# Patient Record
Sex: MALE | Race: WHITE | NOT HISPANIC OR LATINO | Employment: FULL TIME | URBAN - METROPOLITAN AREA
[De-identification: names, ages, dates, MRNs, and addresses within clinical notes are randomized per-mention and may not be internally consistent; named-entity substitution may affect disease eponyms.]

---

## 2019-08-26 ENCOUNTER — PRE VISIT (OUTPATIENT)
Dept: ORTHOPEDICS | Facility: CLINIC | Age: 34
End: 2019-08-26

## 2019-08-26 NOTE — TELEPHONE ENCOUNTER
RECORDS RECEIVED FROM: Low back pain. - No history of surgery. No recent imaging. Med Recs at [facility]. Appt per wife, Faith    DATE RECEIVED: 8/26   NOTES STATUS DETAILS   OFFICE NOTE from referring provider n/a    OFFICE NOTE from other specialist n/a    DISCHARGE SUMMARY from hospital n/a    DISCHARGE REPORT from the ER n/a    OPERATIVE REPORT n/a    MEDICATION LIST n/a    MRI n/a    CT SCAN n/a    XRAYS (IMAGES & REPORTS) n/a    TUMOR     PATHOLOGY  Slides & report n/a    Sent request to NM 8/26  No records for back pain at [facility] 8/26  Tried calling patient to see where she was seen -left VM

## 2019-08-27 ENCOUNTER — OFFICE VISIT (OUTPATIENT)
Dept: ORTHOPEDICS | Facility: CLINIC | Age: 34
End: 2019-08-27
Payer: COMMERCIAL

## 2019-08-27 ENCOUNTER — ANCILLARY PROCEDURE (OUTPATIENT)
Dept: GENERAL RADIOLOGY | Facility: CLINIC | Age: 34
End: 2019-08-27
Attending: FAMILY MEDICINE
Payer: COMMERCIAL

## 2019-08-27 VITALS — HEIGHT: 73 IN | WEIGHT: 179 LBS | BODY MASS INDEX: 23.72 KG/M2

## 2019-08-27 DIAGNOSIS — G89.29 CHRONIC LOW BACK PAIN: ICD-10-CM

## 2019-08-27 DIAGNOSIS — M54.50 CHRONIC MIDLINE LOW BACK PAIN WITHOUT SCIATICA: Primary | ICD-10-CM

## 2019-08-27 DIAGNOSIS — G89.29 CHRONIC MIDLINE LOW BACK PAIN WITHOUT SCIATICA: Primary | ICD-10-CM

## 2019-08-27 DIAGNOSIS — M54.50 CHRONIC LOW BACK PAIN: ICD-10-CM

## 2019-08-27 ASSESSMENT — MIFFLIN-ST. JEOR: SCORE: 1800.82

## 2019-08-27 NOTE — PROGRESS NOTES
"Sports Medicine Clinic Visit    PCP: Yunior Gomez    Elijah Ge is a 33 year old male who is seen  as self referral presenting with lower back pain that has been bothering him over the last 5-6 years. He states that the pain has continued to worsen in the last few years.     Injury: None    Location of Pain: lower back  Duration of Pain: 6 year(s)  Pain is better with: Rest  Pain is worse with: Physical activity, heavy lifting  Additional Features:   Treatment so far consists of: Nothing  Prior History of related problems: noone    Ht 1.854 m (6' 1\")   Wt 81.2 kg (179 lb)   BMI 23.62 kg/m           PMH:  Active problem list:  Patient Active Problem List   Diagnosis     CARDIOVASCULAR SCREENING; LDL GOAL LESS THAN 160     Nasal obstruction     Deviated nasal septum     Preseptal cellulitis of left eye     Hx of LASIK       FH:  Family History   Problem Relation Age of Onset     Cardiovascular Son         VSD     Cancer No family hx of         no skin cancer       SH:  Social History     Socioeconomic History     Marital status:      Spouse name: Not on file     Number of children: Not on file     Years of education: Not on file     Highest education level: Not on file   Occupational History     Not on file   Social Needs     Financial resource strain: Not on file     Food insecurity:     Worry: Not on file     Inability: Not on file     Transportation needs:     Medical: Not on file     Non-medical: Not on file   Tobacco Use     Smoking status: Never Smoker     Smokeless tobacco: Never Used   Substance and Sexual Activity     Alcohol use: Yes     Comment: less than once a week     Drug use: No     Sexual activity: Yes     Partners: Female   Lifestyle     Physical activity:     Days per week: Not on file     Minutes per session: Not on file     Stress: Not on file   Relationships     Social connections:     Talks on phone: Not on file     Gets together: Not on file     Attends Rastafarian service: Not " on file     Active member of club or organization: Not on file     Attends meetings of clubs or organizations: Not on file     Relationship status: Not on file     Intimate partner violence:     Fear of current or ex partner: Not on file     Emotionally abused: Not on file     Physically abused: Not on file     Forced sexual activity: Not on file   Other Topics Concern     Parent/sibling w/ CABG, MI or angioplasty before 65F 55M? No   Social History Narrative     Not on file       MEDS:  See EMR, reviewed  ALL:  See EMR, reviewed    REVIEW OF SYSTEMS:  CONSTITUTIONAL:NEGATIVE for fever, chills, change in weight  INTEGUMENTARY/SKIN: NEGATIVE for worrisome rashes, moles or lesions  EYES: NEGATIVE for vision changes or irritation  ENT/MOUTH: NEGATIVE for ear, mouth and throat problems  RESP:NEGATIVE for significant cough or SOB  BREAST: NEGATIVE for masses, tenderness or discharge  CV: NEGATIVE for chest pain, palpitations or peripheral edema  GI: NEGATIVE for nausea, abdominal pain, heartburn, or change in bowel habits  :NEGATIVE for frequency, dysuria, or hematuria  :NEGATIVE for frequency, dysuria, or hematuria  NEURO: NEGATIVE for weakness, dizziness or paresthesias  ENDOCRINE: NEGATIVE for temperature intolerance, skin/hair changes  HEME/ALLERGY/IMMUNE: NEGATIVE for bleeding problems  PSYCHIATRIC: NEGATIVE for changes in mood or affect         Subjective: This 33-year-old male notes centralized low back discomfort after workout activities.  He will notice it after using the rowing machine or after weighted squats.  These are  recent exercises that he started with the help of an .  However the  has been closely watching his form and says that his form is correct and he can't understand why the exercises are uncomfortable for him.  He also notes discomfort with lifting his children.  He points to the centralized low back as the area of pain.  Is not associated with radiating pain  into the extremities.  No numbness or tingling.  No history of back injury.  Is been a recurrent problem for 5 years.  It does not wake him from sleep.  Is not present in the morning as a pattern of morning stiffness.  He has a seated job at a computer.    Forward flexion of lumbar spine to touch shins.  Extension full, without limitation.  Side-to-side bends without limitation.  Stands on tip-toes and rocks back on heels without limitation.  PSIS joints excurse symmetrically.    Straight leg raise in seated position with chin-to-chest negative bilaterally.    Lower extremity strength is 5/5 symmetrically bilaterally to flexion and extension at hips, knees, ankles, including toe strength and foot evertor strength.    FLORIDA test negative.  Normal ROM at hips bilaterally.  Nontender over bilateral SI joints.  Sacral compression without discomfort.  Spring testing of lumbar spine without discomfort at any level.    Inguinal pulses and posterior tibial pulses strong and symmetrical bilaterally.  No abdominal masses palpated.      Sensation to light touch intact bilateral lower extremities.    Skin overlying low back wnl.  Appropriate in conversation and affect      An x-ray of the lumbar spine shows minimal joint space narrowing at L5-S1 but otherwise good maintenance of the joint space.    Assessment recurrent low back discomfort with mild degenerative disc disease.  Next    Plan: He will start out with the proper core strength training program.  Physical therapy referral placed.  He has a sit/stand station at work that he will take advantage of.  He also does yoga.  He understands that he is not improving with physical therapy he will follow-up and an MRI of the lumbar spine can be considered.

## 2019-08-27 NOTE — LETTER
"  8/27/2019      RE: Elijah Ge  [address] [facility] [place] [address]       Sports Medicine Clinic Visit    PCP: Yunior Gomez    Elijah Ge is a 33 year old male who is seen  as self referral presenting with lower back pain that has been bothering him over the last 5-6 years. He states that the pain has continued to worsen in the last few years.     Injury: None    Location of Pain: lower back  Duration of Pain: 6 year(s)  Pain is better with: Rest  Pain is worse with: Physical activity, heavy lifting  Additional Features:   Treatment so far consists of: Nothing  Prior History of related problems: noone    Ht 1.854 m (6' 1\")   Wt 81.2 kg (179 lb)   BMI 23.62 kg/m            PMH:  Active problem list:  Patient Active Problem List   Diagnosis     CARDIOVASCULAR SCREENING; LDL GOAL LESS THAN 160     Nasal obstruction     Deviated nasal septum     Preseptal cellulitis of left eye     Hx of LASIK       FH:  Family History   Problem Relation Age of Onset     Cardiovascular Son         VSD     Cancer No family hx of         no skin cancer       SH:  Social History     Socioeconomic History     Marital status:      Spouse name: Not on file     Number of children: Not on file     Years of education: Not on file     Highest education level: Not on file   Occupational History     Not on file   Social Needs     Financial resource strain: Not on file     Food insecurity:     Worry: Not on file     Inability: Not on file     Transportation needs:     Medical: Not on file     Non-medical: Not on file   Tobacco Use     Smoking status: Never Smoker     Smokeless tobacco: Never Used   Substance and Sexual Activity     Alcohol use: Yes     Comment: less than once a week     Drug use: No     Sexual activity: Yes     Partners: Female   Lifestyle     Physical activity:     Days per week: Not on file     Minutes per session: Not on file     Stress: Not on file   Relationships     Social connections:     " Talks on phone: Not on file     Gets together: Not on file     Attends Christian service: Not on file     Active member of club or organization: Not on file     Attends meetings of clubs or organizations: Not on file     Relationship status: Not on file     Intimate partner violence:     Fear of current or ex partner: Not on file     Emotionally abused: Not on file     Physically abused: Not on file     Forced sexual activity: Not on file   Other Topics Concern     Parent/sibling w/ CABG, MI or angioplasty before 65F 55M? No   Social History Narrative     Not on file       MEDS:  See EMR, reviewed  ALL:  See EMR, reviewed    REVIEW OF SYSTEMS:  CONSTITUTIONAL:NEGATIVE for fever, chills, change in weight  INTEGUMENTARY/SKIN: NEGATIVE for worrisome rashes, moles or lesions  EYES: NEGATIVE for vision changes or irritation  ENT/MOUTH: NEGATIVE for ear, mouth and throat problems  RESP:NEGATIVE for significant cough or SOB  BREAST: NEGATIVE for masses, tenderness or discharge  CV: NEGATIVE for chest pain, palpitations or peripheral edema  GI: NEGATIVE for nausea, abdominal pain, heartburn, or change in bowel habits  :NEGATIVE for frequency, dysuria, or hematuria  :NEGATIVE for frequency, dysuria, or hematuria  NEURO: NEGATIVE for weakness, dizziness or paresthesias  ENDOCRINE: NEGATIVE for temperature intolerance, skin/hair changes  HEME/ALLERGY/IMMUNE: NEGATIVE for bleeding problems  PSYCHIATRIC: NEGATIVE for changes in mood or affect         Subjective: This 33-year-old male notes centralized low back discomfort after workout activities.  He will notice it after using the rowing machine or after weighted squats.  These are  recent exercises that he started with the help of an .  However the  has been closely watching his form and says that his form is correct and he can't understand why the exercises are uncomfortable for him.  He also notes discomfort with lifting his children.  He  points to the centralized low back as the area of pain.  Is not associated with radiating pain into the extremities.  No numbness or tingling.  No history of back injury.  Is been a recurrent problem for 5 years.  It does not wake him from sleep.  Is not present in the morning as a pattern of morning stiffness.  He has a seated job at a computer.    Forward flexion of lumbar spine to touch shins.  Extension full, without limitation.  Side-to-side bends without limitation.  Stands on tip-toes and rocks back on heels without limitation.  PSIS joints excurse symmetrically.    Straight leg raise in seated position with chin-to-chest negative bilaterally.    Lower extremity strength is 5/5 symmetrically bilaterally to flexion and extension at hips, knees, ankles, including toe strength and foot evertor strength.    FLORIDA test negative.  Normal ROM at hips bilaterally.  Nontender over bilateral SI joints.  Sacral compression without discomfort.  Spring testing of lumbar spine without discomfort at any level.    Inguinal pulses and posterior tibial pulses strong and symmetrical bilaterally.  No abdominal masses palpated.      Sensation to light touch intact bilateral lower extremities.    Skin overlying low back wnl.  Appropriate in conversation and affect      An x-ray of the lumbar spine shows minimal joint space narrowing at L5-S1 but otherwise good maintenance of the joint space.    Assessment recurrent low back discomfort with mild degenerative disc disease.  Next    Plan: He will start out with the proper core strength training program.  Physical therapy referral placed.  He has a sit/stand station at work that he will take advantage of.  He also does yoga.  He understands that he is not improving with physical therapy he will follow-up and an MRI of the lumbar spine can be considered.      Jaguar South MD

## 2020-03-02 ENCOUNTER — HEALTH MAINTENANCE LETTER (OUTPATIENT)
Age: 35
End: 2020-03-02

## 2020-12-20 ENCOUNTER — HEALTH MAINTENANCE LETTER (OUTPATIENT)
Age: 35
End: 2020-12-20

## 2021-04-18 ENCOUNTER — HEALTH MAINTENANCE LETTER (OUTPATIENT)
Age: 36
End: 2021-04-18

## 2021-10-03 ENCOUNTER — HEALTH MAINTENANCE LETTER (OUTPATIENT)
Age: 36
End: 2021-10-03

## 2021-11-28 ENCOUNTER — HEALTH MAINTENANCE LETTER (OUTPATIENT)
Age: 36
End: 2021-11-28

## 2022-09-04 ENCOUNTER — HEALTH MAINTENANCE LETTER (OUTPATIENT)
Age: 37
End: 2022-09-04

## 2022-10-13 ENCOUNTER — OFFICE VISIT (OUTPATIENT)
Dept: FAMILY MEDICINE | Facility: CLINIC | Age: 37
End: 2022-10-13
Payer: COMMERCIAL

## 2022-10-13 VITALS
SYSTOLIC BLOOD PRESSURE: 112 MMHG | BODY MASS INDEX: 23.09 KG/M2 | HEART RATE: 59 BPM | DIASTOLIC BLOOD PRESSURE: 69 MMHG | WEIGHT: 175 LBS | RESPIRATION RATE: 18 BRPM | TEMPERATURE: 98 F

## 2022-10-13 DIAGNOSIS — Z00.00 ROUTINE GENERAL MEDICAL EXAMINATION AT A HEALTH CARE FACILITY: Primary | ICD-10-CM

## 2022-10-13 DIAGNOSIS — Z13.220 SCREENING FOR HYPERLIPIDEMIA: ICD-10-CM

## 2022-10-13 DIAGNOSIS — Z11.4 SCREENING FOR HIV (HUMAN IMMUNODEFICIENCY VIRUS): ICD-10-CM

## 2022-10-13 DIAGNOSIS — Z11.59 NEED FOR HEPATITIS C SCREENING TEST: ICD-10-CM

## 2022-10-13 PROCEDURE — 99385 PREV VISIT NEW AGE 18-39: CPT | Performed by: FAMILY MEDICINE

## 2022-10-13 ASSESSMENT — ENCOUNTER SYMPTOMS
WEAKNESS: 0
PARESTHESIAS: 0
SORE THROAT: 0
DYSURIA: 0
FREQUENCY: 0
DIZZINESS: 0
FEVER: 0
HEMATURIA: 0
HEARTBURN: 0
EYE PAIN: 0
NAUSEA: 0
PALPITATIONS: 0
HEMATOCHEZIA: 0
HEADACHES: 0
COUGH: 0
CHILLS: 0
ARTHRALGIAS: 0
NERVOUS/ANXIOUS: 0
SHORTNESS OF BREATH: 0
DIARRHEA: 0
JOINT SWELLING: 0
MYALGIAS: 0
ABDOMINAL PAIN: 0
CONSTIPATION: 0

## 2022-10-13 NOTE — PROGRESS NOTES
SUBJECTIVE:   CC: Elijah is an 36 year old who presents for preventative health visit.     Patient has been advised of split billing requirements and indicates understanding: Yes  Healthy Habits:     Getting at least 3 servings of Calcium per day:  Yes    Bi-annual eye exam:  NO    Dental care twice a year:  Yes    Sleep apnea or symptoms of sleep apnea:  None    Diet:  Regular (no restrictions)    Frequency of exercise:  2-3 days/week    Duration of exercise:  15-30 minutes    Taking medications regularly:  Not Applicable    Medication side effects:  Not applicable    PHQ-2 Total Score: 0    Additional concerns today:  Yes    Coming for check up because Polly said he was due.    Today's PHQ-2 Score:   PHQ-2 ( 1999 [employer]) 10/13/2022   Q1: Little interest or pleasure in doing things 0   Q2: Feeling down, depressed or hopeless 0   PHQ-2 Score 0   PHQ-2 Total Score (12-17 Years)- Positive if 3 or more points; Administer PHQ-A if positive -   Q1: Little interest or pleasure in doing things Not at all   Q2: Feeling down, depressed or hopeless Not at all   PHQ-2 Score 0       Abuse: Current or Past(Physical, Sexual or Emotional)- No  Do you feel safe in your environment? Yes    Have you ever done Advance Care Planning? (For example, a Health Directive, POLST, or a discussion with a medical provider or your loved ones about your wishes): No, advance care planning information given to patient to review.  Patient declined advance care planning discussion at this time.     Full Code   Wife is agent.    Social History     Tobacco Use     Smoking status: Never     Smokeless tobacco: Never   Substance Use Topics     Alcohol use: Yes     Comment: less than once a week     If you drink alcohol do you typically have >3 drinks per day or >7 drinks per week? No    Alcohol Use 10/13/2022   Prescreen: >3 drinks/day or >7 drinks/week? No   Prescreen: >3 drinks/day or >7 drinks/week? -     Patient Active Problem List   Diagnosis      CARDIOVASCULAR SCREENING; LDL GOAL LESS THAN 160     Nasal obstruction     Deviated nasal septum     Preseptal cellulitis of left eye     Hx of LASIK     Past Surgical History:   Procedure Laterality Date     NASAL SEPTUM SURGERY       SEPTOPLASTY  2006       Social History     Tobacco Use     Smoking status: Never     Smokeless tobacco: Never   Substance Use Topics     Alcohol use: Yes     Comment: less than once a week     Family History   Problem Relation Age of Onset     Coronary Artery Disease Father         bipass surgery     Cardiovascular Son         VSD     Cancer No family hx of         no skin cancer         No current outpatient medications on file.     Allergies   Allergen Reactions     Thimerosal Rash     Eyelid dermatitis; no contraindication to vaccinations.        No lab results found.     Reviewed and updated as needed this visit by clinical staff   Tobacco  Allergies  Meds   Med Hx  Surg Hx  Fam Hx          Reviewed and updated as needed this visit by Provider       Med Hx  Surg Hx  Fam Hx         History reviewed. No pertinent past medical history.   Past Surgical History:   Procedure Laterality Date     NASAL SEPTUM SURGERY       SEPTOPLASTY  2006       Review of Systems   Constitutional: Negative for chills and fever.   HENT: Negative for congestion, ear pain, hearing loss and sore throat.    Eyes: Negative for pain and visual disturbance.   Respiratory: Negative for cough and shortness of breath.    Cardiovascular: Negative for chest pain, palpitations and peripheral edema.   Gastrointestinal: Negative for abdominal pain, constipation, diarrhea, heartburn, hematochezia and nausea.   Genitourinary: Negative for dysuria, frequency, genital sores, hematuria, impotence, penile discharge and urgency.   Musculoskeletal: Negative for arthralgias, joint swelling and myalgias.   Skin: Negative for rash.   Neurological: Negative for dizziness, weakness, headaches and paresthesias.    Psychiatric/Behavioral: Negative for mood changes. The patient is not nervous/anxious.        OBJECTIVE:   /69 (BP Location: Left arm, Patient Position: Sitting, Cuff Size: Adult Regular)   Pulse 59   Temp 98  F (36.7  C) (Oral)   Resp 18   Wt 79.4 kg (175 lb)   BMI 23.09 kg/m      Physical Exam  Gen:   Alert, not distressed  Head:   Normocephalic, without obvious abnormality, atraumatic  Eyes:   PERRL, conjunctiva/corneas clear, EOM's intact  Ears:   Normal tympanic membranes and external ear canals  Nose:    Mucosa normal, no drainage or sinus tenderness  Throat:    No erythema or exudates  Neck:    No adenopathy no nodules in thyroid, normal ROM  Lungs:    Clear to auscultation bilaterally, respirations unlabored  Chest wall:  No tenderness or deformity  Heart:     Regular, normal S1 and S2, no murmur, gallop or rub  Abdomen:  Soft, non-tender, normal bowel sounds, no masses, no organomegaly  Back:    Symmetric, no curvature, ROM normal, no CVA tenderness  Extremities:   Extremities normal, atraumatic, no cyanosis or edema  Skin:     Skin color, texture, turgor normal, no rashes or lesions  Lymph nodes:   Cervical and supraclavicular nodes normal  Neurologic:   CNII-XII intact.   DTRs normal and symmetric.  Symmetric strength and sensation.      ASSESSMENT/PLAN:   Elijah was seen today for physical.    Diagnoses and all orders for this visit:    Routine general medical examination at a health care facility  Patient's time frame was compressed today, as he had to make it to another appointment.  He was not able to stay for labs, but was also not fasting.  We discussed the labs below.  He was ok with screening but also thought risk was low.    Since father recently had coronary artery bypass surgery he was interested in checking in on his cholesterol.    Screening for HIV (human immunodeficiency virus)  -     HIV Antigen Antibody Combo; Future    Need for hepatitis C screening test  -     Hepatitis C  "Screen Reflex to HCV RNA Quant and Genotype; Future    Screening for hyperlipidemia  -     Lipid panel reflex to direct LDL Future    Other orders  -     REVIEW OF HEALTH MAINTENANCE PROTOCOL ORDERS    COUNSELING:   Reviewed preventive health counseling, as reflected in patient instructions       Regular exercise       Healthy diet/nutrition       Immunizations    Declined: COVID bivalent (wishes to research it)    Had influenza vaccine this season, already       Consider Hep C screening for all patients one time for ages 18-79 years       HIV screeninx in teen years, 1x in adult years, and at intervals if high risk       Advance Care Planning    Estimated body mass index is 23.09 kg/m  as calculated from the following:    Height as of 4/3/21: 1.854 m (6' 1\").    Weight as of this encounter: 79.4 kg (175 lb).     He reports that he has never smoked. He has never used smokeless tobacco.    Balwinder Nelson MD  [facility]  "

## 2024-04-28 ENCOUNTER — HEALTH MAINTENANCE LETTER (OUTPATIENT)
Age: 39
End: 2024-04-28

## 2025-08-24 ENCOUNTER — HEALTH MAINTENANCE LETTER (OUTPATIENT)
Age: 40
End: 2025-08-24